# Patient Record
Sex: FEMALE | Race: BLACK OR AFRICAN AMERICAN | NOT HISPANIC OR LATINO | Employment: STUDENT | URBAN - METROPOLITAN AREA
[De-identification: names, ages, dates, MRNs, and addresses within clinical notes are randomized per-mention and may not be internally consistent; named-entity substitution may affect disease eponyms.]

---
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
## 2025-01-19 ENCOUNTER — HEALTH MAINTENANCE LETTER (OUTPATIENT)
Age: 20
End: 2025-01-19

## 2025-08-02 ENCOUNTER — HOSPITAL ENCOUNTER (EMERGENCY)
Facility: CLINIC | Age: 20
Discharge: HOME | End: 2025-08-02
Payer: COMMERCIAL

## 2025-08-02 ENCOUNTER — APPOINTMENT (OUTPATIENT)
Dept: MRI IMAGING | Facility: CLINIC | Age: 20
End: 2025-08-02
Payer: COMMERCIAL

## 2025-08-02 ENCOUNTER — NURSE TRIAGE (OUTPATIENT)
Dept: NURSING | Facility: CLINIC | Age: 20
End: 2025-08-02
Payer: COMMERCIAL

## 2025-08-02 VITALS
BODY MASS INDEX: 24.83 KG/M2 | HEART RATE: 120 BPM | RESPIRATION RATE: 18 BRPM | OXYGEN SATURATION: 99 % | DIASTOLIC BLOOD PRESSURE: 98 MMHG | WEIGHT: 149.2 LBS | TEMPERATURE: 98.2 F | SYSTOLIC BLOOD PRESSURE: 144 MMHG

## 2025-08-02 DIAGNOSIS — H53.9 VISION CHANGES: ICD-10-CM

## 2025-08-02 DIAGNOSIS — R51.9 NONINTRACTABLE HEADACHE, UNSPECIFIED CHRONICITY PATTERN, UNSPECIFIED HEADACHE TYPE: Primary | ICD-10-CM

## 2025-08-02 LAB
CREAT SERPL-MCNC: 0.74 MG/DL (ref 0.51–0.95)
EGFRCR SERPLBLD CKD-EPI 2021: >90 ML/MIN/1.73M2
HCG SERPL QL: NEGATIVE

## 2025-08-02 PROCEDURE — 36415 COLL VENOUS BLD VENIPUNCTURE: CPT

## 2025-08-02 PROCEDURE — 70553 MRI BRAIN STEM W/O & W/DYE: CPT | Mod: XS

## 2025-08-02 PROCEDURE — 255N000002 HC RX 255 OP 636

## 2025-08-02 PROCEDURE — 70553 MRI BRAIN STEM W/O & W/DYE: CPT

## 2025-08-02 PROCEDURE — 99285 EMERGENCY DEPT VISIT HI MDM: CPT | Mod: 25

## 2025-08-02 PROCEDURE — A9585 GADOBUTROL INJECTION: HCPCS

## 2025-08-02 PROCEDURE — 82565 ASSAY OF CREATININE: CPT

## 2025-08-02 PROCEDURE — 84703 CHORIONIC GONADOTROPIN ASSAY: CPT

## 2025-08-02 RX ORDER — KETOROLAC TROMETHAMINE 15 MG/ML
15 INJECTION, SOLUTION INTRAMUSCULAR; INTRAVENOUS ONCE
Status: COMPLETED | OUTPATIENT
Start: 2025-08-02 | End: 2025-08-02

## 2025-08-02 RX ORDER — ACETAMINOPHEN 325 MG/1
975 TABLET ORAL ONCE
Status: COMPLETED | OUTPATIENT
Start: 2025-08-02 | End: 2025-08-02

## 2025-08-02 RX ORDER — DIPHENHYDRAMINE HYDROCHLORIDE 50 MG/ML
25 INJECTION, SOLUTION INTRAMUSCULAR; INTRAVENOUS ONCE
Status: COMPLETED | OUTPATIENT
Start: 2025-08-02 | End: 2025-08-02

## 2025-08-02 RX ORDER — GADOBUTROL 604.72 MG/ML
6.8 INJECTION INTRAVENOUS ONCE
Status: COMPLETED | OUTPATIENT
Start: 2025-08-02 | End: 2025-08-02

## 2025-08-02 RX ADMIN — GADOBUTROL 6.8 ML: 604.72 INJECTION INTRAVENOUS at 20:37

## 2025-08-02 ASSESSMENT — COLUMBIA-SUICIDE SEVERITY RATING SCALE - C-SSRS
6. HAVE YOU EVER DONE ANYTHING, STARTED TO DO ANYTHING, OR PREPARED TO DO ANYTHING TO END YOUR LIFE?: NO
2. HAVE YOU ACTUALLY HAD ANY THOUGHTS OF KILLING YOURSELF IN THE PAST MONTH?: NO
1. IN THE PAST MONTH, HAVE YOU WISHED YOU WERE DEAD OR WISHED YOU COULD GO TO SLEEP AND NOT WAKE UP?: NO

## 2025-08-02 ASSESSMENT — ACTIVITIES OF DAILY LIVING (ADL)
ADLS_ACUITY_SCORE: 41

## 2025-08-04 ENCOUNTER — OFFICE VISIT (OUTPATIENT)
Dept: OPHTHALMOLOGY | Facility: CLINIC | Age: 20
End: 2025-08-04
Attending: OPTOMETRIST
Payer: COMMERCIAL

## 2025-08-04 ENCOUNTER — PATIENT OUTREACH (OUTPATIENT)
Dept: CARE COORDINATION | Facility: CLINIC | Age: 20
End: 2025-08-04

## 2025-08-04 DIAGNOSIS — G93.2 IIH (IDIOPATHIC INTRACRANIAL HYPERTENSION): ICD-10-CM

## 2025-08-04 DIAGNOSIS — H47.10 PAPILLEDEMA: Primary | ICD-10-CM

## 2025-08-04 PROCEDURE — 92133 CPTRZD OPH DX IMG PST SGM ON: CPT | Performed by: OPTOMETRIST

## 2025-08-04 PROCEDURE — 92002 INTRM OPH EXAM NEW PATIENT: CPT | Performed by: OPTOMETRIST

## 2025-08-04 ASSESSMENT — CONF VISUAL FIELD
OD_SUPERIOR_NASAL_RESTRICTION: 0
OS_SUPERIOR_TEMPORAL_RESTRICTION: 0
OS_SUPERIOR_NASAL_RESTRICTION: 0
OD_INFERIOR_TEMPORAL_RESTRICTION: 0
OD_INFERIOR_NASAL_RESTRICTION: 0
OS_NORMAL: 1
OD_SUPERIOR_TEMPORAL_RESTRICTION: 0
OD_NORMAL: 1
OS_INFERIOR_TEMPORAL_RESTRICTION: 0
OS_INFERIOR_NASAL_RESTRICTION: 0

## 2025-08-04 ASSESSMENT — EXTERNAL EXAM - LEFT EYE: OS_EXAM: NORMAL

## 2025-08-04 ASSESSMENT — VISUAL ACUITY
METHOD: SNELLEN - LINEAR
OD_SC: 20/20
OS_SC: 20/20

## 2025-08-04 ASSESSMENT — TONOMETRY
OD_IOP_MMHG: 18
IOP_METHOD: ICARE
OS_IOP_MMHG: 18

## 2025-08-04 ASSESSMENT — CUP TO DISC RATIO
OD_RATIO: 0.1
OS_RATIO: 0.1

## 2025-08-04 ASSESSMENT — SLIT LAMP EXAM - LIDS
COMMENTS: NORMAL
COMMENTS: NORMAL

## 2025-08-04 ASSESSMENT — EXTERNAL EXAM - RIGHT EYE: OD_EXAM: NORMAL

## 2025-08-05 ENCOUNTER — ANCILLARY PROCEDURE (OUTPATIENT)
Dept: CT IMAGING | Facility: CLINIC | Age: 20
End: 2025-08-05
Attending: OPHTHALMOLOGY
Payer: COMMERCIAL

## 2025-08-05 ENCOUNTER — OFFICE VISIT (OUTPATIENT)
Dept: OPHTHALMOLOGY | Facility: CLINIC | Age: 20
End: 2025-08-05
Attending: OPHTHALMOLOGY
Payer: COMMERCIAL

## 2025-08-05 ENCOUNTER — PATIENT OUTREACH (OUTPATIENT)
Dept: CARE COORDINATION | Facility: CLINIC | Age: 20
End: 2025-08-05

## 2025-08-05 DIAGNOSIS — H47.10 OPTIC DISC EDEMA: Primary | ICD-10-CM

## 2025-08-05 DIAGNOSIS — H49.22 LEFT ABDUCENS NERVE PALSY: ICD-10-CM

## 2025-08-05 LAB
OPHTH MEAN DEVIATION LEFT EYE: -1.47 DB
OPHTH MEAN DEVIATION RIGHT EYE: -1.68 DB

## 2025-08-05 PROCEDURE — 70496 CT ANGIOGRAPHY HEAD: CPT | Performed by: RADIOLOGY

## 2025-08-05 PROCEDURE — 92060 SENSORIMOTOR EXAMINATION: CPT | Performed by: OPHTHALMOLOGY

## 2025-08-05 PROCEDURE — 99205 OFFICE O/P NEW HI 60 MIN: CPT | Performed by: OPHTHALMOLOGY

## 2025-08-05 PROCEDURE — 70498 CT ANGIOGRAPHY NECK: CPT | Performed by: RADIOLOGY

## 2025-08-05 PROCEDURE — 92083 EXTENDED VISUAL FIELD XM: CPT | Performed by: OPHTHALMOLOGY

## 2025-08-05 PROCEDURE — G0463 HOSPITAL OUTPT CLINIC VISIT: HCPCS | Performed by: OPHTHALMOLOGY

## 2025-08-05 RX ORDER — ACETAZOLAMIDE 500 MG/1
500 CAPSULE, EXTENDED RELEASE ORAL 2 TIMES DAILY
Qty: 60 CAPSULE | Refills: 11 | Status: SHIPPED | OUTPATIENT
Start: 2025-08-05

## 2025-08-05 RX ORDER — IOPAMIDOL 755 MG/ML
70 INJECTION, SOLUTION INTRAVASCULAR ONCE
Status: COMPLETED | OUTPATIENT
Start: 2025-08-05 | End: 2025-08-05

## 2025-08-05 RX ADMIN — IOPAMIDOL 70 ML: 755 INJECTION, SOLUTION INTRAVASCULAR at 12:15

## 2025-08-05 ASSESSMENT — CONF VISUAL FIELD
OS_INFERIOR_TEMPORAL_RESTRICTION: 0
OS_SUPERIOR_NASAL_RESTRICTION: 0
OD_SUPERIOR_NASAL_RESTRICTION: 0
METHOD: COUNTING FINGERS
OD_INFERIOR_TEMPORAL_RESTRICTION: 0
OD_SUPERIOR_TEMPORAL_RESTRICTION: 0
OD_NORMAL: 1
OS_INFERIOR_NASAL_RESTRICTION: 0
OS_NORMAL: 1
OS_SUPERIOR_TEMPORAL_RESTRICTION: 0
OD_INFERIOR_NASAL_RESTRICTION: 0

## 2025-08-05 ASSESSMENT — SLIT LAMP EXAM - LIDS
COMMENTS: NORMAL
COMMENTS: NORMAL

## 2025-08-05 ASSESSMENT — TONOMETRY
OD_IOP_MMHG: 20
OS_IOP_MMHG: 18
IOP_METHOD: ICARE

## 2025-08-05 ASSESSMENT — VISUAL ACUITY
METHOD: SNELLEN - LINEAR
OS_SC: 20/20
OD_SC+: -2
OD_SC: 20/20
OS_SC+: -1

## 2025-08-05 ASSESSMENT — CUP TO DISC RATIO
OS_RATIO: 0.1
OD_RATIO: 0.1

## 2025-08-05 ASSESSMENT — REFRACTION_MANIFEST
OD_AXIS: 100
OD_SPHERE: -0.75
OS_SPHERE: -0.25
OD_CYLINDER: +0.50

## 2025-08-05 ASSESSMENT — EXTERNAL EXAM - LEFT EYE: OS_EXAM: NORMAL

## 2025-08-05 ASSESSMENT — EXTERNAL EXAM - RIGHT EYE: OD_EXAM: NORMAL

## 2025-08-06 ENCOUNTER — PATIENT OUTREACH (OUTPATIENT)
Dept: CARE COORDINATION | Facility: CLINIC | Age: 20
End: 2025-08-06
Payer: COMMERCIAL

## 2025-08-07 ENCOUNTER — PATIENT OUTREACH (OUTPATIENT)
Dept: CARE COORDINATION | Facility: CLINIC | Age: 20
End: 2025-08-07
Payer: COMMERCIAL

## 2025-08-25 ENCOUNTER — HOSPITAL ENCOUNTER (OUTPATIENT)
Dept: RADIOLOGY | Facility: HOSPITAL | Age: 20
Discharge: HOME | End: 2025-08-25
Attending: OPHTHALMOLOGY | Admitting: RADIOLOGY
Payer: COMMERCIAL

## 2025-08-25 VITALS
DIASTOLIC BLOOD PRESSURE: 92 MMHG | OXYGEN SATURATION: 100 % | TEMPERATURE: 98.2 F | SYSTOLIC BLOOD PRESSURE: 134 MMHG | HEART RATE: 81 BPM

## 2025-08-25 DIAGNOSIS — H47.10 OPTIC DISC EDEMA: ICD-10-CM

## 2025-08-25 LAB
APPEARANCE CSF: CLEAR
COLOR CSF: COLORLESS
GLUCOSE CSF-MCNC: 66 MG/DL (ref 40–70)
PROT CSF-MCNC: 22.7 MG/DL (ref 15–45)
RBC # CSF MANUAL: 1 /UL (ref 0–2)
TUBE # CSF: 4
WBC # CSF MANUAL: 1 /UL (ref 0–5)

## 2025-08-25 PROCEDURE — 89050 BODY FLUID CELL COUNT: CPT

## 2025-08-25 PROCEDURE — 84157 ASSAY OF PROTEIN OTHER: CPT

## 2025-08-25 PROCEDURE — 82945 GLUCOSE OTHER FLUID: CPT

## 2025-08-25 PROCEDURE — 62328 DX LMBR SPI PNXR W/FLUOR/CT: CPT
